# Patient Record
Sex: MALE | Race: WHITE | URBAN - METROPOLITAN AREA
[De-identification: names, ages, dates, MRNs, and addresses within clinical notes are randomized per-mention and may not be internally consistent; named-entity substitution may affect disease eponyms.]

---

## 2025-06-13 ENCOUNTER — OFFICE VISIT (OUTPATIENT)
Dept: URGENT CARE | Age: 20
End: 2025-06-13
Payer: COMMERCIAL

## 2025-06-13 VITALS
DIASTOLIC BLOOD PRESSURE: 76 MMHG | TEMPERATURE: 98.3 F | SYSTOLIC BLOOD PRESSURE: 117 MMHG | HEIGHT: 68 IN | HEART RATE: 71 BPM | WEIGHT: 200 LBS | BODY MASS INDEX: 30.31 KG/M2 | OXYGEN SATURATION: 99 % | RESPIRATION RATE: 16 BRPM

## 2025-06-13 DIAGNOSIS — H60.502 ACUTE OTITIS EXTERNA OF LEFT EAR, UNSPECIFIED TYPE: Primary | ICD-10-CM

## 2025-06-13 RX ORDER — OFLOXACIN 3 MG/ML
5 SOLUTION AURICULAR (OTIC) DAILY
Qty: 5 ML | Refills: 0 | Status: SHIPPED | OUTPATIENT
Start: 2025-06-13 | End: 2025-06-20

## 2025-06-13 ASSESSMENT — PAIN SCALES - GENERAL: PAINLEVEL_OUTOF10: 8

## 2025-06-13 ASSESSMENT — ENCOUNTER SYMPTOMS
COUGH: 0
SORE THROAT: 0
FEVER: 0

## 2025-06-13 ASSESSMENT — PATIENT HEALTH QUESTIONNAIRE - PHQ9
1. LITTLE INTEREST OR PLEASURE IN DOING THINGS: NOT AT ALL
SUM OF ALL RESPONSES TO PHQ9 QUESTIONS 1 AND 2: 0
2. FEELING DOWN, DEPRESSED OR HOPELESS: NOT AT ALL

## 2025-06-13 NOTE — PROGRESS NOTES
"Subjective   Patient ID: Daniel Sarkar is a 20 y.o. male. They present today with a chief complaint of Earache (X 4 days left ear pain  ).    History of Present Illness  Patient is a 20 year old male presenting for evaluation of left ear pain. Symptoms started about 3-4 days ago and worsening. Reports pain with movements of left ear and with chewing and moving jaw. Denies drainage or discharge. Ear feels swollen. Does not wear ear plugs and no recent swimming. Tried cleaning with hydrogen peroxide on q tip without relief.      History provided by:  Patient   used: No    Earache   Pertinent negatives include no coughing, ear discharge or sore throat.       Past Medical History  Allergies as of 06/13/2025    (No Known Allergies)       Prescriptions Prior to Admission[1]     Medical History[2]    Surgical History[3]     reports that he has never smoked. He has never used smokeless tobacco. He reports that he does not use drugs.    Review of Systems  Review of Systems   Constitutional:  Negative for fever.   HENT:  Positive for ear pain. Negative for congestion, ear discharge and sore throat.    Respiratory:  Negative for cough.                                   Objective    Vitals:    06/13/25 0836   BP: 117/76   Pulse: 71   Resp: 16   Temp: 36.8 °C (98.3 °F)   SpO2: 99%   Weight: 90.7 kg (200 lb)   Height: 1.727 m (5' 8\")     No LMP for male patient.    Physical Exam  Constitutional:       General: He is not in acute distress.     Appearance: Normal appearance. He is normal weight. He is not ill-appearing or toxic-appearing.   HENT:      Head: Normocephalic. No right periorbital erythema or left periorbital erythema.      Jaw: There is normal jaw occlusion. No trismus.      Right Ear: Tympanic membrane, ear canal and external ear normal. There is no impacted cerumen. Tympanic membrane is not erythematous or bulging.      Left Ear: Tympanic membrane, ear canal and external ear normal. There is no " impacted cerumen. Tympanic membrane is not erythematous or bulging.      Ears:      Comments: Partial obstruction with cerumen on left but pain with otoscope exam and movement of left external ear   No drainage from ear     Mouth/Throat:      Mouth: Mucous membranes are moist.      Pharynx: No oropharyngeal exudate or posterior oropharyngeal erythema.   Eyes:      General: No scleral icterus.        Right eye: No discharge.         Left eye: No discharge.      Conjunctiva/sclera: Conjunctivae normal.   Neck:      Trachea: Phonation normal.   Cardiovascular:      Rate and Rhythm: Normal rate and regular rhythm.      Heart sounds: Normal heart sounds. No murmur heard.     No friction rub. No gallop.   Pulmonary:      Effort: Pulmonary effort is normal. No respiratory distress.      Breath sounds: Normal breath sounds. No stridor. No wheezing, rhonchi or rales.   Lymphadenopathy:      Cervical: No cervical adenopathy.   Neurological:      General: No focal deficit present.      Mental Status: He is alert.      Gait: Gait normal.   Psychiatric:         Mood and Affect: Mood normal.         Behavior: Behavior normal.         Thought Content: Thought content normal.         Procedures    Point of Care Test & Imaging Results from this visit  No results found for this visit on 06/13/25.   Imaging  No results found.    Cardiology, Vascular, and Other Imaging  No other imaging results found for the past 2 days      Diagnostic study results (if any) were reviewed by Kenya Viera PA-C.    Assessment/Plan   Allergies, medications, history, and pertinent labs/EKGs/Imaging reviewed by Kenya Viera PA-C.     Medical Decision Making  Patient is a 20 year old male presenting for evaluation of left ear pain. Hemodynamically stable. Exam with movement of left external ear and otoscope exam. There is partial cerumen impaction but able to visualize TM and no erythema or bulging to suggest AOM. No findings of mastoiditis or TM  perforation. Posterior oropharynx clear without RPA or PTA. Suspect otitis externa, will start ear drops. Discussed avoiding putting anything in ear. Antihistamine and antiinflammatories for pain and swelling.     At time of discharge, patient was clinically well-appearing and appropriate for outpatient management. The patient/parent/guardian was educated regarding diagnosis, supportive care, OTC and Rx medications. The patient/parent/guardian was given the opportunity to ask questions prior to discharge. They verbalized understanding of discussion of treatment plan, expected course of illness and/or injury, indications on when to return to , when to seek further evaluation in ED/call 911, and the need to follow up with PCP and/or specialist as referred. Patient/parent/guardian was provided with work/school documentation if requested. Patient stable upon discharge.     Orders and Diagnoses  Diagnoses and all orders for this visit:  Acute otitis externa of left ear, unspecified type  -     ofloxacin (Floxin) 0.3 % otic solution; Administer 5 drops into the left ear once daily for 7 days.      Medical Admin Record      Patient disposition: Home    Electronically signed by Kenya Viera PA-C  8:47 AM           [1] (Not in a hospital admission)   [2]   Past Medical History:  Diagnosis Date    Personal history of other diseases of the respiratory system 01/31/2015    History of upper respiratory infection    Personal history of other diseases of the respiratory system     History of sore throat    Personal history of other specified conditions 01/30/2020    History of wheezing    Personal history of other specified conditions 01/31/2015    History of wheezing   [3]   Past Surgical History:  Procedure Laterality Date    OTHER SURGICAL HISTORY  02/14/2022    No history of surgery

## 2025-06-13 NOTE — PATIENT INSTRUCTIONS
Tylenol or ibuprofen for pain   Start antihistamine until symptoms resolve   Lay on right side after administering drops for at least 15 minutes